# Patient Record
(demographics unavailable — no encounter records)

---

## 2024-10-31 NOTE — PHYSICAL EXAM
[Normal] : supple, no neck mass and thyroid not enlarged [Normal Supraclavicular Lymph Nodes] : normal supraclavicular lymph nodes [Normal Axillary Lymph Nodes] : normal axillary lymph nodes [Normal] : oriented to person, place and time, with appropriate affect [de-identified] : Mild fibrocystic changes but no dominant masses or nipple discharge

## 2024-10-31 NOTE — HISTORY OF PRESENT ILLNESS
[de-identified] : Mallika Kan is a 65 year old female who presents for an annual breast exam.  Past Breast History: - s/p LEFT Breast Biopsy on June 27, 1991, path revealed fibroadenoma. 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 - s/p RIGHT Breast Biopsy on July 31, 2002, path revealed lobular hyperplasia with lymphocytic infiltrate and ductal hyperplasia. - s/p RIGHT Breast Biopsy on July 22, 2015, path revealed fibrocystic changes.  Denies family history of breast or ovarian cancer.   S/p laparoscopic appendectomy with primary closure of an umbilical hernia with Dr. Rico Rader on 12/8/2020.  B/L mammo/sono 8/2023- new benign appearing left 9:00 N6 mass, BIRADS 0 *mammo BIRADS 2  F/U left mammo done 8/23/2023 (we do not have report available) was reportedly benign (BIRADS 3) and 6 month F/U recommended.  Left breast sono 2/10/24: No evidence of malignancy. BIRADS 2  b/l breast mammoscreening/sono 9/16/2024: No evidence of malignancy. BIRADS 2   She denies palpable breast masses, nipple discharge, skin changes, inversion or breast pain.   Internist: Dr. Ca

## 2024-10-31 NOTE — HISTORY OF PRESENT ILLNESS
[de-identified] : Mallika Kan is a 65 year old female who presents for an annual breast exam.  Past Breast History: - s/p LEFT Breast Biopsy on June 27, 1991, path revealed fibroadenoma. 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 - s/p RIGHT Breast Biopsy on July 31, 2002, path revealed lobular hyperplasia with lymphocytic infiltrate and ductal hyperplasia. - s/p RIGHT Breast Biopsy on July 22, 2015, path revealed fibrocystic changes.  Denies family history of breast or ovarian cancer.   S/p laparoscopic appendectomy with primary closure of an umbilical hernia with Dr. Rico Rader on 12/8/2020.  B/L mammo/sono 8/2023- new benign appearing left 9:00 N6 mass, BIRADS 0 *mammo BIRADS 2  F/U left mammo done 8/23/2023 (we do not have report available) was reportedly benign (BIRADS 3) and 6 month F/U recommended.  Left breast sono 2/10/24: No evidence of malignancy. BIRADS 2  b/l breast mammoscreening/sono 9/16/2024: No evidence of malignancy. BIRADS 2   She denies palpable breast masses, nipple discharge, skin changes, inversion or breast pain.   Internist: Dr. Ca

## 2024-10-31 NOTE — ASSESSMENT
[FreeTextEntry1] : IMP: Fibrocystic breasts  B/L mammo/sono 8/2023- new benign appearing left 9:00 N6 mass, BIRADS 0 F/U left mammo done 8/23/2023 (we do not have report available) was reportedly benign (BIRADS 3) and 6 month F/U recommended. b/l breast sono 2/24: BIRADS 2 b/l mammo/sono 9/2024: BR 2   PLAN: Annual mammo/sono 9/2025 RTO yearly

## 2024-10-31 NOTE — PHYSICAL EXAM
[Normal] : supple, no neck mass and thyroid not enlarged [Normal Supraclavicular Lymph Nodes] : normal supraclavicular lymph nodes [Normal Axillary Lymph Nodes] : normal axillary lymph nodes [Normal] : oriented to person, place and time, with appropriate affect [de-identified] : Mild fibrocystic changes but no dominant masses or nipple discharge

## 2024-10-31 NOTE — PHYSICAL EXAM
[Normal] : supple, no neck mass and thyroid not enlarged [Normal Supraclavicular Lymph Nodes] : normal supraclavicular lymph nodes [Normal Axillary Lymph Nodes] : normal axillary lymph nodes [Normal] : oriented to person, place and time, with appropriate affect [de-identified] : Mild fibrocystic changes but no dominant masses or nipple discharge

## 2024-10-31 NOTE — HISTORY OF PRESENT ILLNESS
[de-identified] : Mallika Kan is a 65 year old female who presents for an annual breast exam.  Past Breast History: - s/p LEFT Breast Biopsy on June 27, 1991, path revealed fibroadenoma. 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 - s/p RIGHT Breast Biopsy on July 31, 2002, path revealed lobular hyperplasia with lymphocytic infiltrate and ductal hyperplasia. - s/p RIGHT Breast Biopsy on July 22, 2015, path revealed fibrocystic changes.  Denies family history of breast or ovarian cancer.   S/p laparoscopic appendectomy with primary closure of an umbilical hernia with Dr. Rico Rader on 12/8/2020.  B/L mammo/sono 8/2023- new benign appearing left 9:00 N6 mass, BIRADS 0 *mammo BIRADS 2  F/U left mammo done 8/23/2023 (we do not have report available) was reportedly benign (BIRADS 3) and 6 month F/U recommended.  Left breast sono 2/10/24: No evidence of malignancy. BIRADS 2  b/l breast mammoscreening/sono 9/16/2024: No evidence of malignancy. BIRADS 2   She denies palpable breast masses, nipple discharge, skin changes, inversion or breast pain.   Internist: Dr. Ca

## 2025-04-29 NOTE — DISCUSSION/SUMMARY
[FreeTextEntry1] : The visit was provided via telehealth using real-time 2-way audio visual technology. The patient, Mallika Kan, was located at home, [address] [place] [address], at the time of the visit. The Genetic Counselor, Adilia Harvey, was located remotely in [place], [place]. The patient and the Genetic Counselor both participated in the telehealth encounter. Her spouse, Cliff Kan, also participated. Consent for telehealth services was given on 2025 by the patient, Mallika Kan.  REASON FOR CONSULT Mallika Kan is a 65-year-old female who self-referred for cancer genetic counseling and cascade testing due to known familial BRCA1 and CHEK2 mutations. She was accompanied by her spouse, Cliff.   RELEVANT MEDICAL HISTORY Ms. Kan reports she was diagnosed with basal cell carcinoma on her nose in  at age 64 which solely required excision.  Ms. Kan reports a history of numerous breast biopsies/excisions, approximately 10 total. She follows with a breast specialist at [facility], Dr. Ramin Da Silva. Per review of her breast specialist's (Dr. 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 See below for additional breast biopsy findings.  She has a family history of Hereditary Breast and Ovarian Cancer (HBOC) syndrome caused by a pathogenic mutation in the BRCA1 gene (c.68_69del; p.Apb13Kishe*17) identified in her son. She also has a family history of an additional inherited cancer predisposition caused by a pathogenic low penetrance mutation in the CHEK2 gene (c.1283C>T; p.[id number]) identified in this same son, see below.  OTHER MEDICAL AND SURGICAL HISTORY: Medical: HLD Surgical: Appendectomy/Hernia repair 2020, Shoulder surgery  PAST OB/GYN HISTORY: Obstetrical History:  Age at Menarche: 13 Menopausal with LMP at age 54  Age at First Live Birth: 30 Oral Contraceptive Use: Yes, 8 years total Hormone Replacement Therapy: None  CANCER SCREENING HISTORY:   Breast:  -	sMammo/US Breast: 2024; Results: BR2 benign -	MRI: None -	Breast Biopsies: Yes, Ms. Kan reports a history of numerous breast biopsies/excisions, approximately 10 total since age 21 (with 3-4 performed from age 21-31). The following is per review by her breast specialist's (Dr. 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 Ms. Kan reports all others were fibroadenomas and most others were breast biopsies with one additional surgical excision.  GYN: -	Pelvic exam: 2024  -	Pap smear: 2024; Results: reportedly normal -	TVUS: Yes, 2024; Results: reportedly monitoring "uterine cyst"; Frequency: yearly Colon: -	Most recent colonoscopy: 10/2024 reportedly with 3 "benign" polyps and advised to repeat in 3 years per Ms. Kan -	2019 Colonoscopy Results: three hyperplastic polyps ("two small polyps" .2x.1x.1cm / .1x.1x.1cm, "a 1-2mm polyp" .1x.1x.1cm); Frequency: 5 years per summary letter -	 Colonoscopy Results: Transverse 4 small polyps 2-6mm in size/L-colon 2-3mm polyp: Transverse Colon Polyps specimen consists of multiple pieces of soft tissue longest measuring 0.8x0.4 cm - sessile serrated polyp/adenoma(1), Hyperplastic polyp fragments; L-colon polyp - hyperplastic polyp fragments, polypoid fragment of colonic mucosa without adenomatous features, suggestive of mucosal excrescence -	Total Polyps: 11 as above, all <1cm (multiple hyperplastic polyps and one sessile serrated adenoma known) Skin:   -	FBSE: 2025; Frequency: 2x/year -	Lesions biopsied/removed: Reportedly solely the one BCC Other: -	Endoscopy: 2023; Results: Normal upper endoscopy  SOCIAL HISTORY: -	 -	Tobacco-product use: Never smoker -	Environmental exposure: None  FAMILY HISTORY: Maternal ancestry was reported as Spanish (Ashkenazi Caodaism) and paternal ancestry was reported as [place] (Ashkenazi Ca[place]ism). A detailed family history of cancer was ascertained. Relevant diagnoses are detailed below and in the scanned pedigree.   Of note, Ms. Kan's son pursued genetic testing using [employer]'s 167-gene Genetic Health Screen. This revealed a pathogenic mutation in the BRCA1 gene (c.68_69del; p.Pxm61Qwgvq*17) and a pathogenic low penetrance mutation in the CHEK2 gene (c.1283C>T; p.Puk673Eok). This report is dated 2025. They provided a copy of this report for our review. She also reports he is a carrier of Gaucher disease.  They state their daughter is also pursuing genetic counseling/genetic testing.  To Ms. Kan's knowledge, no one else in the family has had germline testing for cancer susceptibility.   	 RISK ASSESSMENT: Ms. Kan's son's BRCA1 positive genetic test result is consistent with the hereditary cancer predisposition syndrome, HBOC. Additionally, the low penetrance CHEK2 mutation identified in Ms. Kan's son is consistent with an additional familial hereditary cancer predisposition. It was discussed that Ms. Kan has a 50% chance to test positive for the BRCA1 gene mutation (c.68_69del; p.Pvs53Cugkw*17) detected in her son as well as a 50% chance to test positive for the low penetrance CHEK2 gene mutation (c.1283C>T; p.Avv267Uwq) detected in her son. We also discussed that approximately 1 in 40 individuals with Ashkenazi Caodaism ancestry carry a mutation in the BRCA1/2 genes.   There is limited data regarding hereditary phyllodes tumor. In one multi-center cohort publication from  it was found that the women with phyllodes tumors that pursued genetic testing a deleterious mutation was identified in approximately 10%. Of those with malignant phyllodes tumor, two were found to have a pathogenic mutation in TP53. One BRCA1 mutation was found in a woman with benign phyllodes tumor (N=[id number]).  We therefore recommended genetic testing for analysis of 4 genes: BRCA1, BRCA2, CHEK2, TP53.  We discussed the risks, benefits and limitations, and implications of genetic testing. We also discussed the psychosocial implications of genetic testing. Possible test results were reviewed with Ms. Kan, along with associated medical management options. The Genetic Information Non-discrimination Act (LITA) was also reviewed.   Ms. Kan verbally consented to the above mentioned genetic testing panel. She requested to proceed via blood draw in lieu of completing a saliva sample kit. Blood will be drawn on 2025 in our laboratory at the [facility] and sent to [facility]. Informed consent form will be completed when Ms. Kan presents to the office.  PLAN:  1.	Blood drawn on 2025 will be sent to [facility] for analysis. 2.	Ms. Kan will complete informed consent form when she presents to the office on 2025. 3.	Family member's report(s) will be scanned to [facility] secure file cabinet. 4.	We will contact Ms. Kan once the results are available and will schedule a follow-up appointment, as needed. Results generally return in 2-3 weeks from the day the sample is received in the lab.  For any additional questions please call [facility] at [phone].    Adilia Harvey MS, [facility] Genetic Counselor, [facility]   CC:  Patient

## 2025-05-01 NOTE — DISCUSSION/SUMMARY
[FreeTextEntry1] : 5/1/2025  Ms. Kan consented and had her blood drawn today at the [facility] in [place] for the cancer genetic testing previously discussed (see [facility]etics Counseling note from 4/29/2025). She was made aware we will reach out to her once the results are available in approximately 2-3 weeks. She requested we enter her [facility] test order via their self-pay option (249$) instead of submitting through insurance due to having a high-deductible plan. This change was submitted in [employer]' test ordering portal today. Ms. Kan was accompanied by her spouse, Cliff, and completed a medical release form providing permission to share her genetics information with him. This will be scanned into her chart.  Adilia Harvey MS, [facility] Genetic Counselor, Cancer Genetics.

## 2025-05-27 NOTE — DISCUSSION/SUMMARY
[FreeTextEntry1] : RESULTS TRANSMISSION Mallika Kan is a 65-year-old female who was called 5/27/2025 for a discussion regarding her genetic testing results related to hereditary cancer predisposition.   Ms. Kan was originally seen at [facility] on 4/29/2025 for hereditary cancer predisposition risk assessment and cascade genetic testing due to pathogenic BRCA1 and low penetrance CHEK2 mutations previously identified in her son. Ms. Kan decided to pursue genetic testing using [facility]'s Custom Next Panel.  TEST RESULTS: NEGATIVE  BRCA1 c.68_69del; p.Toa99Ibxsx*17 - NOT DETECTED CHEK2 c.1283C>T; p.Nun726Sih - NOT DETECTED  No pathogenic (disease-causing) variants or VUSs were detected in the following genes: BRCA1, BRCA2, CHEK, TP53.  RESULTS INTERPRETATION AND ASSESSMENT: It was discussed that Ms. Mccrays genetic testing results should be considered a true negative. Given Ms. Kan's personal and current reported family history of cancer, her negative genetic test results, and in the absence of other indications, she should practice age-appropriate cancer screening of organ systems as recommended for the general population. Long-term breast surveillance should be based on the discretion of Ms. Kan's breast team.  Our knowledge of genetics and inherited cancer conditions is changing rapidly. We emphasized the importance of re-contacting us with updates regarding her personal and family history of cancer as well as any updates regarding additional cancer genetic test results performed for the patient and/or family members. Such updates could possibly change our risk assessment and recommendations.  In addition, we discussed the importance of her other family members pursuing genetic counseling with the option of genetic testing for the familial BRCA1 and CHEK2 mutations. We would be happy to see any at-risk relatives in the future.  PLAN: 1.	See above for recommended screening and risk-reduction strategies. 2.	Patient informed consult note(s) will be available through their [facility] patient portal and genetic test results will be released via [facility] laboratory portal.  3.	Ms. Kan was encouraged to contact us every 2-3 years to discuss relevant advances in cancer genetics, or sooner if there are any changes in her personal or family history of cancer.   For any additional questions please call [facility] at [phone].    Daphney Oconnor MS, [facility] Genetic Counselor, [facility]etics   CC: Patient

## 2025-06-13 NOTE — HEALTH RISK ASSESSMENT
[Very Good] : ~his/her~  mood as very good [2 - 4 times a month (2 pts)] : 2-4 times a month (2 points) [1 or 2 (0 pts)] : 1 or 2 (0 points) [Never (0 pts)] : Never (0 points) [No] : In the past 12 months have you used drugs other than those required for medical reasons? No [Little interest or pleasure doing things] : 1) Little interest or pleasure doing things [Feeling down, depressed, or hopeless] : 2) Feeling down, depressed, or hopeless [0] : 2) Feeling down, depressed, or hopeless: Not at all (0) [PHQ-2 Negative - No further assessment needed] : PHQ-2 Negative - No further assessment needed [Yes] : takes [None] : Patient does not have any barriers to medication adherence [Never] : Never [Patient reported mammogram was normal] : Patient reported mammogram was normal [Patient reported colonoscopy was abnormal] : Patient reported colonoscopy was abnormal [] :  [Fully functional (bathing, dressing, toileting, transferring, walking, feeding)] : Fully functional (bathing, dressing, toileting, transferring, walking, feeding) [Fully functional (using the telephone, shopping, preparing meals, housekeeping, doing laundry, using] : Fully functional and needs no help or supervision to perform IADLs (using the telephone, shopping, preparing meals, housekeeping, doing laundry, using transportation, managing medications and managing finances) [Audit-CScore] : 2 [[id number]] : 0 [Change in mental status noted] : No change in mental status noted [Language] : denies difficulty with language [Behavior] : denies difficulty with behavior [Learning/Retaining New Information] : denies difficulty learning/retaining new information [Handling Complex Tasks] : denies difficulty handling complex tasks [Reasoning] : denies difficulty with reasoning [Spatial Ability and Orientation] : denies difficulty with spatial ability and orientation [MammogramDate] : 09/24 [ColonoscopyDate] : 10/24 [ColonoscopyComments] : 3 polyps - repeat 3 yrs - Dr Ware

## 2025-06-13 NOTE — ASSESSMENT
[FreeTextEntry1] : hyperlipidemia continue atorvastatin  elevated LFT stable [Vaccines Reviewed] : Immunizations reviewed today. Please see immunization details in the vaccine log within the immunization flowsheet.